# Patient Record
Sex: FEMALE | Race: WHITE
[De-identification: names, ages, dates, MRNs, and addresses within clinical notes are randomized per-mention and may not be internally consistent; named-entity substitution may affect disease eponyms.]

---

## 2018-03-28 ENCOUNTER — HOSPITAL ENCOUNTER (OUTPATIENT)
Dept: HOSPITAL 82 - ED | Age: 68
Setting detail: OBSERVATION
LOS: 1 days | Discharge: HOME | End: 2018-03-29
Attending: INTERNAL MEDICINE | Admitting: INTERNAL MEDICINE
Payer: MEDICARE

## 2018-03-28 VITALS — SYSTOLIC BLOOD PRESSURE: 125 MMHG | DIASTOLIC BLOOD PRESSURE: 79 MMHG

## 2018-03-28 VITALS — DIASTOLIC BLOOD PRESSURE: 79 MMHG | SYSTOLIC BLOOD PRESSURE: 157 MMHG

## 2018-03-28 VITALS — HEIGHT: 60 IN | BODY MASS INDEX: 30.04 KG/M2 | WEIGHT: 153 LBS

## 2018-03-28 VITALS — SYSTOLIC BLOOD PRESSURE: 136 MMHG | DIASTOLIC BLOOD PRESSURE: 81 MMHG

## 2018-03-28 DIAGNOSIS — R05: ICD-10-CM

## 2018-03-28 DIAGNOSIS — Z87.891: ICD-10-CM

## 2018-03-28 DIAGNOSIS — J06.9: ICD-10-CM

## 2018-03-28 DIAGNOSIS — E78.5: ICD-10-CM

## 2018-03-28 DIAGNOSIS — R42: Primary | ICD-10-CM

## 2018-03-28 DIAGNOSIS — Z86.69: ICD-10-CM

## 2018-03-28 DIAGNOSIS — I10: ICD-10-CM

## 2018-03-28 DIAGNOSIS — K21.9: ICD-10-CM

## 2018-03-28 LAB
ANION GAP SERPL CALCULATED.3IONS-SCNC: 18 MMOL/L
BASOPHILS NFR BLD AUTO: 1 % (ref 0–3)
BILIRUB UR QL STRIP.AUTO: NEGATIVE
BUN SERPL-MCNC: 15 MG/DL (ref 8–23)
BUN/CREAT SERPL: 23
CHLORIDE SERPL-SCNC: 105 MMOL/L (ref 95–108)
CHOLEST SERPL-MCNC: 300 MG/DL (ref 0–199)
CHOLEST/HDLC SERPL: 3.3 {RATIO}
CLARITY UR: CLEAR
CO2 SERPL-SCNC: 24 MMOL/L (ref 22–30)
COLOR UR AUTO: YELLOW
CREAT SERPL-MCNC: 0.7 MG/DL (ref 0.5–1)
EOSINOPHIL NFR BLD AUTO: 1 % (ref 0–8)
ERYTHROCYTE [DISTWIDTH] IN BLOOD BY AUTOMATED COUNT: 12.8 % (ref 11.5–15.5)
GLUCOSE UR STRIP.AUTO-MCNC: NEGATIVE MG/DL
HCT VFR BLD AUTO: 41.5 % (ref 37–47)
HDLC SERPL-MCNC: 91 MG/DL (ref 40–?)
HGB BLD-MCNC: 14 G/DL (ref 12–16)
HGB UR QL STRIP.AUTO: NEGATIVE
IMM GRANULOCYTES NFR BLD: 2.4 % (ref 0–1)
KETONES UR STRIP.AUTO-MCNC: NEGATIVE MG/DL
LDLC SERPL CALC-MCNC: 171 MG/DL
LEUKOCYTE ESTERASE UR QL STRIP.AUTO: NEGATIVE
LYMPHOCYTES NFR BLD: 26 % (ref 15–41)
MCH RBC QN AUTO: 32.2 PG  CALC (ref 26–32)
MCHC RBC AUTO-ENTMCNC: 33.7 G/L CALC (ref 32–36)
MCV RBC AUTO: 95.4 FL  CALC (ref 80–100)
MONOCYTES NFR BLD AUTO: 7 % (ref 2–13)
NEUTROPHILS # BLD AUTO: 5.84 THOU/UL (ref 2–7.15)
NEUTROPHILS NFR BLD AUTO: 64 % (ref 42–76)
NITRITE UR QL STRIP.AUTO: NEGATIVE
PH UR STRIP.AUTO: 7 [PH] (ref 4.5–8)
PLATELET # BLD AUTO: 355 THOU/UL (ref 130–400)
POTASSIUM SERPL-SCNC: 3.8 MMOL/L (ref 3.5–5.1)
PROT UR QL STRIP.AUTO: NEGATIVE MG/DL
RBC # BLD AUTO: 4.35 MILL/UL (ref 4.2–5.6)
SODIUM SERPL-SCNC: 143 MMOL/L (ref 137–146)
SP GR UR STRIP.AUTO: 1.02
TRIGL SERPL-MCNC: 193 MG/DL (ref 30–149)
UROBILINOGEN UR QL STRIP.AUTO: 0.2 E.U./DL
VLDLC SERPL CALC-MCNC: 39 MG/DL

## 2018-03-28 NOTE — NUR
ASSESSMENT DONE AND TELE IN PLACE. RESPS EVEN AND UNLABORED. # 20 LAC THAT
APPEARS HEALTHY. PT DENIES PAIN AT THIS TIME. PT DENIES ANY OTHER NEEDS AT
THIS TIME.  IN ROOM.

## 2018-03-28 NOTE — NUR
PT GIVEN MOTRIN FOR HEADACHE AND ROBITUSSIN DM FOR COUGH. COUGH SOUNDS DRY; PT
STATES THAT SOMETIMES IT IS PRODUCTIVE WITH THICK YELLOW EXPECTORANT. NO OTHER
COMPLAINTS AT THIS TIME.

## 2018-03-28 NOTE — NUR
PT STATES THAT SHE HAS MECLIZINE BACK IN [place] FOR SINUSITIS, BUT FORGOT
TO BRING IT WITH HER IN JANUARY.

## 2018-03-28 NOTE — NUR
PT CAME FROM ER VIA STRETCHER BY BROOK GRIMES. STAND BY ASSIST TO BED. ORIENTED TO
CALL LIGHT AND SAFETY PRECAUTIONS REINFORCED. CALL LIGHT IN REACH.

## 2018-03-28 NOTE — NUR
BEDSIDE REPORT RECEIVED FROM BROOK LEBRON. PT RESTING IN BED WITH
 AT BEDSIDE. VI GAVE ANTIVERT AT 1850 WITH GOOD EFFECT. PT DENIES
PAIN AND NAUSEA AT THIS TIME. RESPIRATIONS EVEN AND UNLABORED ON ROOM AIR.
PLAN OF CARE DISCUSSED. PT ENCOURAGED TO VERBLIZE CONCERNS. STATES
UNDERSTANDING AND REQUESTS A GINGERALE. SAFETY MEASURES IN PLACE. CALL LIGHT
WITHIN REACH.

## 2018-03-29 VITALS — DIASTOLIC BLOOD PRESSURE: 71 MMHG | SYSTOLIC BLOOD PRESSURE: 131 MMHG

## 2018-03-29 VITALS — SYSTOLIC BLOOD PRESSURE: 120 MMHG | DIASTOLIC BLOOD PRESSURE: 68 MMHG

## 2018-03-29 VITALS — SYSTOLIC BLOOD PRESSURE: 121 MMHG | DIASTOLIC BLOOD PRESSURE: 75 MMHG

## 2018-03-29 VITALS — DIASTOLIC BLOOD PRESSURE: 80 MMHG | SYSTOLIC BLOOD PRESSURE: 135 MMHG

## 2018-03-29 NOTE — NUR
PT ASLEEP AT THIS TIME; AWAKENS SPONTANEOUSLY FOR VS. DENIES PAIN AND
DIZZINESS AT THIS TIME. RESPIRATIONS EVEN AND UNLABORED. NO ACUTE CHANGES IN
CONDITION THROUGHOUT THE NIGHT. USES CALL LIGHT PRN FOR ASSISTANCE. SAFETY
MEASURES IN PLACE. CALL LIGHT WITHIN REACH.

## 2018-03-29 NOTE — NUR
ASSESSMENT DONE AND TELE IN PLACE. RESPS EVEN AND UNLABORED. PT DENIES PAIN AT
THIS TIME. # 20 LAC THAT APPEARS HEALTHY. SAFETY PRECAUTIONS REINFORCED AND
CALL LIGHT IN REACH.

## 2018-03-29 NOTE — NUR
PT ASLEEP AT THIS TIME; AWAKENS SPONTANEOUSLY FOR LAB DRAW. STATES THAT
HEADACHCE AND COUGH HAS SUBSIDED. NO OTHER REQUESTS OR COMPLAINTS AT THIS
TIME. SAFETY MEASURES IN PLACE. CALL LIGHT WITHIN REACH.

## 2018-03-29 NOTE — NUR
PT IS SEMI FOWLERS EATING HER LUNCH WITH NO S/S OF DISTRESS NOTED. PT DENIES
PAIN AT THIS TIME. CALL LIGHT IN REACH.

## 2018-03-29 NOTE — NUR
BEDSIDE REPORT RECEIVED BY BROOK GONZALES. PT IS RESTING IN BED AND DENIES NEEDS
AT THIS TIME. CALL LIGHT IN REACH.

## 2025-03-23 ENCOUNTER — HOSPITAL ENCOUNTER (EMERGENCY)
Dept: HOSPITAL 82 - ED | Age: 75
Discharge: HOME | End: 2025-03-23
Payer: MEDICARE

## 2025-03-23 VITALS — DIASTOLIC BLOOD PRESSURE: 62 MMHG | SYSTOLIC BLOOD PRESSURE: 117 MMHG

## 2025-03-23 VITALS — SYSTOLIC BLOOD PRESSURE: 120 MMHG | DIASTOLIC BLOOD PRESSURE: 69 MMHG

## 2025-03-23 VITALS — SYSTOLIC BLOOD PRESSURE: 127 MMHG | DIASTOLIC BLOOD PRESSURE: 72 MMHG

## 2025-03-23 VITALS — WEIGHT: 149.91 LBS | BODY MASS INDEX: 29.43 KG/M2 | HEIGHT: 60 IN

## 2025-03-23 VITALS — DIASTOLIC BLOOD PRESSURE: 69 MMHG | SYSTOLIC BLOOD PRESSURE: 131 MMHG

## 2025-03-23 VITALS — DIASTOLIC BLOOD PRESSURE: 74 MMHG | SYSTOLIC BLOOD PRESSURE: 147 MMHG

## 2025-03-23 DIAGNOSIS — Z20.822: ICD-10-CM

## 2025-03-23 DIAGNOSIS — J06.9: Primary | ICD-10-CM

## 2025-03-23 DIAGNOSIS — I10: ICD-10-CM
